# Patient Record
Sex: MALE | Race: WHITE
[De-identification: names, ages, dates, MRNs, and addresses within clinical notes are randomized per-mention and may not be internally consistent; named-entity substitution may affect disease eponyms.]

---

## 2017-04-24 ENCOUNTER — HOSPITAL ENCOUNTER (OUTPATIENT)
Dept: HOSPITAL 45 - C.LAB1850 | Age: 77
Discharge: HOME | End: 2017-04-24
Attending: INTERNAL MEDICINE
Payer: COMMERCIAL

## 2017-04-24 DIAGNOSIS — N40.1: ICD-10-CM

## 2017-04-24 DIAGNOSIS — I10: ICD-10-CM

## 2017-04-24 DIAGNOSIS — R73.9: ICD-10-CM

## 2017-04-24 DIAGNOSIS — E78.00: Primary | ICD-10-CM

## 2017-04-24 LAB
ALBUMIN/GLOB SERPL: 0.9 {RATIO} (ref 0.9–2)
ALP SERPL-CCNC: 80 U/L (ref 45–117)
ALT SERPL-CCNC: 28 U/L (ref 12–78)
ANION GAP SERPL CALC-SCNC: 6 MMOL/L (ref 3–11)
AST SERPL-CCNC: 15 U/L (ref 15–37)
BASOPHILS # BLD: 0.04 K/UL (ref 0–0.2)
BASOPHILS NFR BLD: 0.5 %
BUN SERPL-MCNC: 22 MG/DL (ref 7–18)
BUN/CREAT SERPL: 20.2 (ref 10–20)
CALCIUM SERPL-MCNC: 9.2 MG/DL (ref 8.5–10.1)
CHLORIDE SERPL-SCNC: 109 MMOL/L (ref 98–107)
CHOLEST/HDLC SERPL: 3.4 {RATIO}
CO2 SERPL-SCNC: 29 MMOL/L (ref 21–32)
COMPLETE: YES
CREAT SERPL-MCNC: 1.1 MG/DL (ref 0.6–1.4)
EOSINOPHIL NFR BLD AUTO: 253 K/UL (ref 130–400)
EST. AVERAGE GLUCOSE BLD GHB EST-MCNC: 114 MG/DL
GLOBULIN SER-MCNC: 3.9 GM/DL (ref 2.5–4)
GLUCOSE SERPL-MCNC: 115 MG/DL (ref 70–99)
GLUCOSE UR QL: 45 MG/DL
HCT VFR BLD CALC: 43.9 % (ref 42–52)
IG%: 0.1 %
IMM GRANULOCYTES NFR BLD AUTO: 23.9 %
KETONES UR QL STRIP: 84 MG/DL
LYMPHOCYTES # BLD: 1.88 K/UL (ref 1.2–3.4)
MCH RBC QN AUTO: 31 PG (ref 25–34)
MCHC RBC AUTO-ENTMCNC: 33.5 G/DL (ref 32–36)
MCV RBC AUTO: 92.6 FL (ref 80–100)
MONOCYTES NFR BLD: 5.9 %
NEUTROPHILS # BLD AUTO: 1.9 %
NEUTROPHILS NFR BLD AUTO: 67.7 %
NITRITE UR QL STRIP: 111 MG/DL (ref 0–150)
PH UR: 151 MG/DL (ref 0–200)
PMV BLD AUTO: 9 FL (ref 7.4–10.4)
POTASSIUM SERPL-SCNC: 4 MMOL/L (ref 3.5–5.1)
PSA SERPL-MCNC: 1.44 NG/ML (ref 0–4)
RBC # BLD AUTO: 4.74 M/UL (ref 4.7–6.1)
SODIUM SERPL-SCNC: 144 MMOL/L (ref 136–145)
VERY LOW DENSITY LIPOPROT CALC: 22 MG/DL
WBC # BLD AUTO: 7.86 K/UL (ref 4.8–10.8)

## 2017-04-25 ENCOUNTER — HOSPITAL ENCOUNTER (OUTPATIENT)
Dept: HOSPITAL 45 - C.LABSPEC | Age: 77
Discharge: HOME | End: 2017-04-25
Attending: UROLOGY
Payer: COMMERCIAL

## 2017-04-25 DIAGNOSIS — N40.1: Primary | ICD-10-CM

## 2017-04-25 DIAGNOSIS — R30.0: ICD-10-CM

## 2017-04-28 ENCOUNTER — HOSPITAL ENCOUNTER (OUTPATIENT)
Dept: HOSPITAL 45 - C.CTS | Age: 77
Discharge: HOME | End: 2017-04-28
Attending: UROLOGY
Payer: COMMERCIAL

## 2017-04-28 DIAGNOSIS — N20.0: ICD-10-CM

## 2017-04-28 DIAGNOSIS — R31.29: Primary | ICD-10-CM

## 2017-04-28 DIAGNOSIS — N40.0: ICD-10-CM

## 2017-04-28 DIAGNOSIS — N28.1: ICD-10-CM

## 2017-04-28 NOTE — DIAGNOSTIC IMAGING REPORT
CT ABD/PELVIS COMBO



CLINICAL HISTORY: Microscopic hematuria. History of stomach carcinoma    



COMPARISON STUDY:  None.



TECHNIQUE: Unenhanced images were obtained through the abdomen and pelvis. The

patient was injected with 50 cc Optiray 320. After 5 minute delay, the patient

was reimaged in a dynamic helical fashion during the additional administration

of 67 cc of Optiray 320.



CT DOSE: 1800.60 mGycm



FINDINGS:



Lower chest: There are coronary artery calcifications present.



Liver: The contrast-enhanced liver is normal in size, contour, and attenuation.

There is no intrahepatic biliary ductal dilatation. The hepatic veins and portal

veins are patent.



Gallbladder: Unremarkable.



Spleen: Normal in size and attenuation.



Pancreas: Unremarkable.



Adrenal glands: Unremarkable.



Kidneys: There is a 2 mm nonobstructing lower pole left renal calculus. No

ureteral or bladder calculi are visualized. There are bilateral renal cysts.

There is a dominant 7.4 cm lower pole right renal cyst. The largest cyst on the

left measures 33 mm. No collecting system masses are visualized. No ureteral

masses are visualized.



Bowel: There are no transition zones to indicate bowel obstruction. There is no

acute diverticulitis. The appendix is surgically absent.



Peritoneum: There is no intraperitoneal free air or abdominal ascites. There is

a small fat-containing left inguinal hernia.



Vasculature: The abdominal aorta is normal in course and caliber.



Adenopathy: None.



Pelvic viscera: There is mild bladder wall thickening. The prostate is enlarged

measuring 5.2 cm transversely.



Skeletal structures: There is a superior endplate L1 compression deformity. No

destructive lesions are visualized.



IMPRESSION:  

1. 2 mm nonobstructing lower pole left renal calculus

2. Bilateral renal cysts

3. No collecting system or ureteral lesions identified

4. Enlarged prostate

5. Bladder wall thickening







Electronically signed by:  Asaf Vo M.D.

4/28/2017 11:31 AM



Dictated Date/Time:  4/28/2017 11:26 AM

## 2017-05-17 ENCOUNTER — HOSPITAL ENCOUNTER (OUTPATIENT)
Dept: HOSPITAL 45 - C.RAD1850 | Age: 77
Discharge: HOME | End: 2017-05-17
Attending: INTERNAL MEDICINE
Payer: COMMERCIAL

## 2017-05-17 DIAGNOSIS — M25.862: ICD-10-CM

## 2017-05-17 DIAGNOSIS — M25.562: Primary | ICD-10-CM

## 2017-05-17 NOTE — DIAGNOSTIC IMAGING REPORT
LEFT KNEE 1 OR 2 VIEWS ROUTINE



CLINICAL HISTORY: Left knee pain. Palpable mass.     



COMPARISON: None.



DISCUSSION: No fractures are visualized. There are no erosive or destructive

changes. There are vascular calcifications present. The etiology of the

patient's reported palpable mass is not evident.



IMPRESSION: 

1. No acute fractures

2. Vascular calcifications.







Electronically signed by:  Asaf Vo M.D.

5/17/2017 9:44 AM



Dictated Date/Time:  5/17/2017 9:42 AM

## 2017-11-20 ENCOUNTER — HOSPITAL ENCOUNTER (OUTPATIENT)
Dept: HOSPITAL 45 - C.LAB1850 | Age: 77
Discharge: HOME | End: 2017-11-20
Attending: INTERNAL MEDICINE
Payer: COMMERCIAL

## 2017-11-20 DIAGNOSIS — E78.00: Primary | ICD-10-CM

## 2017-11-20 LAB
ALBUMIN/GLOB SERPL: 1.1 {RATIO} (ref 0.9–2)
ALP SERPL-CCNC: 74 U/L (ref 45–117)
ALT SERPL-CCNC: 25 U/L (ref 12–78)
ANION GAP SERPL CALC-SCNC: 9 MMOL/L (ref 3–11)
AST SERPL-CCNC: 19 U/L (ref 15–37)
BUN SERPL-MCNC: 21 MG/DL (ref 7–18)
BUN/CREAT SERPL: 21.9 (ref 10–20)
CALCIUM SERPL-MCNC: 9.4 MG/DL (ref 8.5–10.1)
CHLORIDE SERPL-SCNC: 105 MMOL/L (ref 98–107)
CHOLEST/HDLC SERPL: 4.3 {RATIO}
CO2 SERPL-SCNC: 27 MMOL/L (ref 21–32)
CREAT SERPL-MCNC: 0.97 MG/DL (ref 0.6–1.4)
GLOBULIN SER-MCNC: 3.6 GM/DL (ref 2.5–4)
GLUCOSE SERPL-MCNC: 103 MG/DL (ref 70–99)
GLUCOSE UR QL: 44 MG/DL
KETONES UR QL STRIP: 118 MG/DL
NITRITE UR QL STRIP: 144 MG/DL (ref 0–150)
PH UR: 191 MG/DL (ref 0–200)
POTASSIUM SERPL-SCNC: 3.8 MMOL/L (ref 3.5–5.1)
SODIUM SERPL-SCNC: 141 MMOL/L (ref 136–145)
VERY LOW DENSITY LIPOPROT CALC: 29 MG/DL

## 2018-01-11 ENCOUNTER — HOSPITAL ENCOUNTER (OUTPATIENT)
Dept: HOSPITAL 45 - C.LAB1850 | Age: 78
Discharge: HOME | End: 2018-01-11
Attending: INTERNAL MEDICINE
Payer: COMMERCIAL

## 2018-01-11 DIAGNOSIS — R94.6: Primary | ICD-10-CM

## 2018-02-23 ENCOUNTER — HOSPITAL ENCOUNTER (OUTPATIENT)
Dept: HOSPITAL 45 - C.LAB1850 | Age: 78
Discharge: HOME | End: 2018-02-23
Attending: INTERNAL MEDICINE
Payer: COMMERCIAL

## 2018-02-23 DIAGNOSIS — E03.9: Primary | ICD-10-CM

## 2018-08-08 ENCOUNTER — HOSPITAL ENCOUNTER (OUTPATIENT)
Dept: HOSPITAL 45 - C.LAB1850 | Age: 78
Discharge: HOME | End: 2018-08-08
Attending: PHYSICIAN ASSISTANT
Payer: COMMERCIAL

## 2018-08-08 DIAGNOSIS — A69.20: Primary | ICD-10-CM
